# Patient Record
Sex: MALE | Race: WHITE | NOT HISPANIC OR LATINO | Employment: FULL TIME | URBAN - METROPOLITAN AREA
[De-identification: names, ages, dates, MRNs, and addresses within clinical notes are randomized per-mention and may not be internally consistent; named-entity substitution may affect disease eponyms.]

---

## 2017-02-07 ENCOUNTER — HISTORICAL (OUTPATIENT)
Dept: RADIOLOGY | Facility: HOSPITAL | Age: 47
End: 2017-02-07

## 2017-04-06 ENCOUNTER — HISTORICAL (OUTPATIENT)
Dept: LAB | Facility: HOSPITAL | Age: 47
End: 2017-04-06

## 2017-05-08 LAB — FINAL CULTURE: NORMAL

## 2017-05-30 ENCOUNTER — HISTORICAL (OUTPATIENT)
Dept: SURGERY | Facility: HOSPITAL | Age: 47
End: 2017-05-30

## 2017-05-30 LAB
ABS NEUT (OLG): 4.5 X10(3)/MCL (ref 1.5–6.9)
ALBUMIN SERPL-MCNC: 4 GM/DL (ref 3.4–5)
ALBUMIN/GLOB SERPL: 1.1 RATIO
ALP SERPL-CCNC: 94 UNIT/L (ref 30–113)
ALT SERPL-CCNC: 60 UNIT/L (ref 10–45)
APTT PPP: 30.7 SECOND(S) (ref 25–35)
AST SERPL-CCNC: 31 UNIT/L (ref 15–37)
BILIRUB SERPL-MCNC: 0.3 MG/DL (ref 0.1–0.9)
BILIRUBIN DIRECT+TOT PNL SERPL-MCNC: 0.1 MG/DL (ref 0–0.3)
BILIRUBIN DIRECT+TOT PNL SERPL-MCNC: 0.2 MG/DL
BUN SERPL-MCNC: 12 MG/DL (ref 10–20)
CALCIUM SERPL-MCNC: 8.8 MG/DL (ref 8–10.5)
CHLORIDE SERPL-SCNC: 106 MMOL/L (ref 100–108)
CO2 SERPL-SCNC: 28 MMOL/L (ref 21–35)
CREAT SERPL-MCNC: 0.94 MG/DL (ref 0.7–1.3)
ERYTHROCYTE [DISTWIDTH] IN BLOOD BY AUTOMATED COUNT: 14.2 % (ref 11.5–17)
GLOBULIN SER-MCNC: 3.6 GM/DL
GLUCOSE SERPL-MCNC: 114 MG/DL (ref 75–116)
HCT VFR BLD AUTO: 42.3 % (ref 42–52)
HGB BLD-MCNC: 14.4 GM/DL (ref 14–18)
INR PPP: 1 (ref 0–1.2)
MCH RBC QN AUTO: 31 PG (ref 27–34)
MCHC RBC AUTO-ENTMCNC: 34 GM/DL (ref 31–36)
MCV RBC AUTO: 90 FL (ref 80–99)
PLATELET # BLD AUTO: 222 X10(3)/MCL (ref 140–400)
PMV BLD AUTO: 9.8 FL (ref 6.8–10)
POTASSIUM SERPL-SCNC: 4 MMOL/L (ref 3.6–5.2)
PROT SERPL-MCNC: 7.6 GM/DL (ref 6.4–8.2)
PROTHROMBIN TIME: 10.5 SECOND(S) (ref 9–12)
RBC # BLD AUTO: 4.71 X10(6)/MCL (ref 4.7–6.1)
SODIUM SERPL-SCNC: 138 MMOL/L (ref 135–145)
WBC # SPEC AUTO: 6.7 X10(3)/MCL (ref 4.5–11.5)

## 2017-06-01 ENCOUNTER — HISTORICAL (OUTPATIENT)
Dept: ANESTHESIOLOGY | Facility: HOSPITAL | Age: 47
End: 2017-06-01

## 2020-01-22 ENCOUNTER — HISTORICAL (OUTPATIENT)
Dept: RADIOLOGY | Facility: HOSPITAL | Age: 50
End: 2020-01-22

## 2021-08-13 ENCOUNTER — HISTORICAL (OUTPATIENT)
Dept: RADIOLOGY | Facility: HOSPITAL | Age: 51
End: 2021-08-13

## 2022-02-18 ENCOUNTER — HISTORICAL (OUTPATIENT)
Dept: ADMINISTRATIVE | Facility: HOSPITAL | Age: 52
End: 2022-02-18

## 2022-03-03 ENCOUNTER — HISTORICAL (OUTPATIENT)
Dept: RADIOLOGY | Facility: HOSPITAL | Age: 52
End: 2022-03-03

## 2022-03-03 ENCOUNTER — HISTORICAL (OUTPATIENT)
Dept: ADMINISTRATIVE | Facility: HOSPITAL | Age: 52
End: 2022-03-03

## 2022-04-30 NOTE — OP NOTE
PROCEDURE:  Esophagogastroduodenoscopy.    PREOPERATIVE DIAGNOSIS:  Epigastric pain and also dyspepsia.    POSTOPERATIVE DIAGNOSES:    1. Mild to moderate gastritis, status post H pylori of gastric antrum.    2. Focal inflammation (polypoid) of the angularis, cardiac and fundic regions, status post random biopsy.    HISTORY OF PRESENT ILLNESS:  A very pleasant, 47-year-old gentleman who had a diagnosed bout of diverticulitis per CT scans and examination approximately 3 months ago.  At that point in time, he was treated for that in the hospital.  He recovered well.  Today he was supposed to undergo appropriate workup GI workup for his diverticula after his diverticular bout.  CT scan showed that it was diverticulitis in the transverse colon.     Upon evaluating the patient this morning, the patient states that he had, over the last 2 weeks since I had seen in my office, developed some epigastric pain that he said was new.  The pain was causing him issues after he ate and he was not having any changes in his bowel habits as he was with his diverticular issues of recent past.  I therefore elected to perform EGD in the setting of new symptoms to discern if this is something new versus a recurrence of something that was perhaps not apparent at the time of his hospital stay last time     He was consented for the procedure here at the hospital with the nurse, Debra present.  The risks and benefits of the procedure were explained to him before.  He had no preoperative anesthesia or sedation.  Please see their records for perioperative documentation and administration of anesthesia.    PROCEDURE IN DETAIL:  He was brought down to the endoscopy room suite, laid in the left lateral decubitus position.  Bite block was placed.  Anesthesia was administered.  The Olympus gastroscope was then lubricated and inserted in the posterior oropharynx.  Photographs were taken.  There were no abnormalities in the posterior pharynx  or laryngeal region.     The scope was then lubricated, passed down the esophagus without any signs of blockage.     The scope was then used to insufflate and washout with Mylicon, the mucosa.       The pylorus was intubated.  The duodenum and jejunum showed no abnormalities.  The mucosa appeared healthy, normal without any masses, polyps or any ulcerations.     The scope was pulled back in the greater and lesser curvature.  There was some mild gastritis that was present.  A biopsy was taken the gastric antrum for H pylori.  This was sent off for path.     The scope was pulled back and in the angularis and fundic and cardiac regions, there were focal areas, approximately 7-10 mm areas of focal, erythematous inflammation that had some submucosal elevation (polypoid) in nature.  I biopsied one of the more prominent ones in the angularis and sent this off for path in jar #2.  I did not biopsy the other areas because they were hyperemic and tended to ooze very easily, but they all had similar pathology.  There were no other masses, true polyps or ulceration.  He did not have a hiatal hernia.  The scope was pulled back out of retroflexion and the GE junction was within normal limits.  No masses.  No polyps.  Good mucosal integrity and esophageal integrity.  The scope was pulled back in the proximal.  The entire esophageal column had good peristalsis and no signs of any mucosal changes.  The scope was pulled back and removed.  The patient tolerated the procedure well.     In summary, a 47-year-old white male with mild to moderate gastritis with focal areas of erythematous inflammation status post random biopsy.     With regard to the patient's abdominal pain, the first question in my mind is was disconnected at all to his hospital stay prior to.  CT showed some inflammation but again, could this play a part or is it something new altogether.  Symptomatically, I think the 2 are separate.     We will follow up on the  biopsies.  Consider putting him on empiric PPI and we will see him back in my office to discuss those details.     See colonoscopy results as well below.  Of note, the patient is on anti rheumatologic drugs, so take that into consideration.       Differential here includes infection, bacterial or even viral considering his immunosuppression due to immunosuppressive drugs, versus diet induced.      POLLY/MARCELA   DD: 06/01/2017 0807   DT: 06/01/2017 0825  Job # [id number]    cc: Crow Daigle III, M.D.

## 2022-04-30 NOTE — H&P
ADDENDUM:  The patient was being evaluated today for first time colonoscopy due to diverticulitis approximately 2-3 months ago.  At that point in time he was having change in his bowel habits as well.     Upon discussion with the patient, he had new symptoms over the last 2 weeks duration.  He had postprandial type pain and dyspepsia.  Therefore, I elected to perform EGD in addition to the colonoscopy because of the similar anatomic locale of symptoms, in addition to the fact that this was new symptomatology compared to what he was presenting with 2-3 months ago.     Any questions regarding this, please feel free to call my office at [phone].     I think in the patient's best interest while he is here we should perform EGD to evaluate his new symptoms.        RADHA   DD: 06/01/2017 0813   DT: 06/01/2017 0832  Job # [id number]    cc: Crow Daigle III, M.D.

## 2022-04-30 NOTE — OP NOTE
PROCEDURE:  Colonoscopy.    PREOPERATIVE DIAGNOSIS:  Diverticulitis (history of).    POSTOPERATIVE DIAGNOSES:    1. Left-sided predominant diverticulosis without perforation, without bleed.    2. Nonspecific inflammatory changes to the sigmoid status post random biopsies.    HISTORY:  Please see my dictation for history on the EGD.  We are performing colonoscopy to ensure the patient has no malignant potential associated with his diverticular flare from 3 or 4 months ago.     The patient was consented for the procedure in my office prior to.  The risks and benefits of the procedure were explained to him in detail.  He was willing to undergo those risks.     The patient brought down to the endoscopy room suite and after he had his EGD, he was maintained in the left lateral decubitus position.  Rectal exam was performed.  Externally he had some atrophic changes to the perianal region due to his psoriasis.  Good rectal tone.  No internal abnormalities palpated.  Prostate was smooth, trilobar, without any changes.     The Olympus colonoscope was then lubricated and advanced all the way to the cecum.  The cecum was visualized and photographed.  The appendiceal orifice was within normal limits.  The terminal ilium was intubated and photographed.  It showed no internal abnormalities.     The cecum in its entirety showed no internal abnormalities as well.     The scope was pulled back.  The right side was evaluated.  No signs of diverticular disease.  No masses.  No polyps.  Same applies to the hepatic flexure.     There was a paucity of diverticula, maybe 1 or 2 that were approximately 2-3 mm, 4 at the most, in the transverse colon.  This is where his diverticular flare was per CT.  Somewhat ironic considering the CT findings and the degree of issues he was having.  Nonetheless, he did have some mild diverticular disease without bleeding, without perforation.  No masses, no polyps in this region.  Same applies to  the splenic flexure.     There was an increasing gradient in the distal descending of diverticular disease, primarily in the sigmoid.  However, no masses, polyps otherwise.     The scope was pulled back and in the sigmoid colon there was the most pronounced amount of diverticular disease.  There was an area of mild focal inflammation and these were biopsied randomly in the sigmoid.  These were sent in a separate jar.     The scope was pulled back in the rectum and retroflexion showed no internal abnormalities.     In summary, a 47-year-old white male with left-sided predominant diverticulosis without perforation, without bleed, with nonspecific inflammation of the sigmoid status post biopsy.     We will follow up on this gentleman's biopsies.  Again, I think diverticular issues were true in the sense that they caused him issues; however, could he have some other sort of colonic process going on, a colitis of some sort?  was this an infectious colitis due to his immunosuppressants?  again, we will try to monitor this over time and see how he does.  For right now, in terms of recommendations of colonoscopy, we will repeat in 5-10 years depending on symptomatology.        POLLY/MARCELA   DD: 06/01/2017 0811   DT: 06/01/2017 0829  Job # [id number]

## 2023-07-17 ENCOUNTER — CLINICAL SUPPORT (OUTPATIENT)
Dept: RESPIRATORY THERAPY | Facility: HOSPITAL | Age: 53
End: 2023-07-17
Attending: INTERNAL MEDICINE
Payer: COMMERCIAL

## 2023-07-17 DIAGNOSIS — Z80.0 FAMILY HISTORY OF COLON CANCER: Primary | ICD-10-CM

## 2023-07-17 DIAGNOSIS — Z80.0 FAMILY HISTORY OF COLON CANCER: ICD-10-CM

## 2023-07-17 PROCEDURE — 93010 EKG 12-LEAD: ICD-10-PCS | Mod: ,,, | Performed by: INTERNAL MEDICINE

## 2023-07-17 PROCEDURE — 93005 ELECTROCARDIOGRAM TRACING: CPT

## 2023-07-17 PROCEDURE — 93010 ELECTROCARDIOGRAM REPORT: CPT | Mod: ,,, | Performed by: INTERNAL MEDICINE

## 2023-07-17 RX ORDER — OMEPRAZOLE 40 MG/1
1 CAPSULE, DELAYED RELEASE ORAL EVERY MORNING
COMMUNITY
Start: 2023-07-17

## 2023-07-17 RX ORDER — SERTRALINE HYDROCHLORIDE 50 MG/1
50 TABLET, FILM COATED ORAL EVERY MORNING
COMMUNITY
Start: 2023-07-10

## 2023-07-17 RX ORDER — LEVOCETIRIZINE DIHYDROCHLORIDE 5 MG/1
5 TABLET, FILM COATED ORAL NIGHTLY
COMMUNITY

## 2023-07-17 RX ORDER — CLONAZEPAM 0.5 MG/1
TABLET ORAL
COMMUNITY
Start: 2023-06-29

## 2023-07-17 RX ORDER — FOLIC ACID 1 MG/1
1000 TABLET ORAL EVERY MORNING
COMMUNITY
Start: 2023-05-15

## 2023-07-17 RX ORDER — MULTIVITAMIN
1 TABLET ORAL EVERY MORNING
COMMUNITY

## 2023-07-17 RX ORDER — AZELASTINE 1 MG/ML
2 SPRAY, METERED NASAL 2 TIMES DAILY
COMMUNITY
Start: 2023-06-26

## 2023-07-17 RX ORDER — METHOTREXATE 25 MG/ML
INJECTION INTRA-ARTERIAL; INTRAMUSCULAR; INTRATHECAL; INTRAVENOUS
COMMUNITY
Start: 2023-07-10

## 2023-07-17 NOTE — DISCHARGE INSTRUCTIONS
Follow prep on Wednesday. Clear liquids only. Nothing by mouth after midnight.                                                                                            INSTRUCTIONS  AFTER A COLONOSCOPY/EGD                                                                                    NO DRIVING X 24 HOURS. NOTIFY YOUR DOCTOR WITH                                                                                 ABDOMINAL PAIN UNRELIEVED BY  PASSING GAS,                                                                           FEVER WITHIN 24 HOURS, OR LARGE AMOUNT OF BLEEDING.

## 2023-07-19 ENCOUNTER — ANESTHESIA EVENT (OUTPATIENT)
Dept: SURGERY | Facility: HOSPITAL | Age: 53
End: 2023-07-19
Payer: COMMERCIAL

## 2023-07-19 NOTE — ANESTHESIA PREPROCEDURE EVALUATION
07/19/2023  Leonid Blount is a 53 y.o., male.      Pre-op Assessment    I have reviewed the Patient Summary Reports.     I have reviewed the Nursing Notes. I have reviewed the NPO Status.   I have reviewed the Medications.     Review of Systems  Anesthesia Hx:  Denies Family Hx of Anesthesia complications.   Denies Personal Hx of Anesthesia complications.   Social:  Former Smoker, No Alcohol Use    Hematology/Oncology:  Hematology Normal   Oncology Normal     EENT/Dental:EENT/Dental Normal   Cardiovascular:  Cardiovascular Normal  ECG has been reviewed.    Pulmonary:  Pulmonary Normal    Renal/:  Renal/ Normal     Hepatic/GI:  Hepatic/GI Normal    Musculoskeletal:  Musculoskeletal Normal    Neurological:  Neurology Normal    Endocrine:  Endocrine Normal    Dermatological:  Skin Normal    Psych:  Psychiatric Normal           Physical Exam  General: Cooperative, Alert and Oriented    Airway:  Mallampati: II   Mouth Opening: Normal  TM Distance: Normal  Tongue: Normal  Neck ROM: Normal ROM    Dental:  Intact        Anesthesia Plan  Type of Anesthesia, risks & benefits discussed:    Anesthesia Type: Gen Natural Airway  Intra-op Monitoring Plan: Standard ASA Monitors  Post Op Pain Control Plan:   (medical reason for not using multimodal pain management)  Induction:  IV  Informed Consent: Informed consent signed with the Patient and all parties understand the risks and agree with anesthesia plan.  All questions answered. Patient consented to blood products? Yes  ASA Score: 1    Ready For Surgery From Anesthesia Perspective.     .

## 2023-07-20 ENCOUNTER — HOSPITAL ENCOUNTER (OUTPATIENT)
Facility: HOSPITAL | Age: 53
Discharge: HOME | End: 2023-07-20
Attending: INTERNAL MEDICINE | Admitting: INTERNAL MEDICINE
Payer: COMMERCIAL

## 2023-07-20 ENCOUNTER — ANESTHESIA (OUTPATIENT)
Dept: SURGERY | Facility: HOSPITAL | Age: 53
End: 2023-07-20
Payer: COMMERCIAL

## 2023-07-20 VITALS
TEMPERATURE: 98 F | OXYGEN SATURATION: 97 % | SYSTOLIC BLOOD PRESSURE: 132 MMHG | DIASTOLIC BLOOD PRESSURE: 84 MMHG | WEIGHT: 187 LBS | HEIGHT: 67 IN | RESPIRATION RATE: 20 BRPM | HEART RATE: 58 BPM | BODY MASS INDEX: 29.35 KG/M2

## 2023-07-20 DIAGNOSIS — Z80.0 FAMILY HX OF COLON CANCER: ICD-10-CM

## 2023-07-20 DIAGNOSIS — D63.8 ANEMIA OF CHRONIC ILLNESS: ICD-10-CM

## 2023-07-20 DIAGNOSIS — Z80.0 FAMILY HISTORY OF COLON CANCER: ICD-10-CM

## 2023-07-20 PROCEDURE — D9220A PRA ANESTHESIA: Mod: ,,, | Performed by: NURSE ANESTHETIST, CERTIFIED REGISTERED

## 2023-07-20 PROCEDURE — 45385 COLONOSCOPY W/LESION REMOVAL: CPT | Mod: PT | Performed by: INTERNAL MEDICINE

## 2023-07-20 PROCEDURE — 63600175 PHARM REV CODE 636 W HCPCS: Performed by: ANESTHESIOLOGY

## 2023-07-20 PROCEDURE — 37000008 HC ANESTHESIA 1ST 15 MINUTES: Performed by: INTERNAL MEDICINE

## 2023-07-20 PROCEDURE — 37000009 HC ANESTHESIA EA ADD 15 MINS: Performed by: INTERNAL MEDICINE

## 2023-07-20 PROCEDURE — D9220A PRA ANESTHESIA: ICD-10-PCS | Mod: ,,, | Performed by: NURSE ANESTHETIST, CERTIFIED REGISTERED

## 2023-07-20 PROCEDURE — 43239 EGD BIOPSY SINGLE/MULTIPLE: CPT | Performed by: INTERNAL MEDICINE

## 2023-07-20 PROCEDURE — 45380 COLONOSCOPY AND BIOPSY: CPT | Mod: PT | Performed by: INTERNAL MEDICINE

## 2023-07-20 PROCEDURE — 25000003 PHARM REV CODE 250: Performed by: NURSE ANESTHETIST, CERTIFIED REGISTERED

## 2023-07-20 PROCEDURE — 27201423 OPTIME MED/SURG SUP & DEVICES STERILE SUPPLY: Performed by: INTERNAL MEDICINE

## 2023-07-20 PROCEDURE — 63600175 PHARM REV CODE 636 W HCPCS: Performed by: NURSE ANESTHETIST, CERTIFIED REGISTERED

## 2023-07-20 RX ORDER — PROPOFOL 10 MG/ML
VIAL (ML) INTRAVENOUS
Status: DISCONTINUED | OUTPATIENT
Start: 2023-07-20 | End: 2023-07-20

## 2023-07-20 RX ORDER — LIDOCAINE HYDROCHLORIDE 20 MG/ML
INJECTION INTRAVENOUS
Status: DISCONTINUED | OUTPATIENT
Start: 2023-07-20 | End: 2023-07-20

## 2023-07-20 RX ORDER — SODIUM CHLORIDE, SODIUM LACTATE, POTASSIUM CHLORIDE, CALCIUM CHLORIDE 600; 310; 30; 20 MG/100ML; MG/100ML; MG/100ML; MG/100ML
INJECTION, SOLUTION INTRAVENOUS CONTINUOUS
Status: DISCONTINUED | OUTPATIENT
Start: 2023-07-20 | End: 2023-07-20 | Stop reason: HOSPADM

## 2023-07-20 RX ADMIN — PROPOFOL 50 MG: 10 INJECTION, EMULSION INTRAVENOUS at 07:07

## 2023-07-20 RX ADMIN — PROPOFOL 50 MG: 10 INJECTION, EMULSION INTRAVENOUS at 06:07

## 2023-07-20 RX ADMIN — SODIUM CHLORIDE, POTASSIUM CHLORIDE, SODIUM LACTATE AND CALCIUM CHLORIDE: 600; 310; 30; 20 INJECTION, SOLUTION INTRAVENOUS at 05:07

## 2023-07-20 RX ADMIN — LIDOCAINE HYDROCHLORIDE 100 MG: 20 INJECTION, SOLUTION INTRAVENOUS at 06:07

## 2023-07-20 RX ADMIN — PROPOFOL 100 MG: 10 INJECTION, EMULSION INTRAVENOUS at 06:07

## 2023-07-20 NOTE — OP NOTE
[facility] - Periop Services  Operative Note    Date of Procedure: 7/20/2023     Procedure: Procedure(s) (LRB):  COLONOSCOPY (N/A)  EGD (ESOPHAGOGASTRODUODENOSCOPY) (N/A)  EGD, WITH CLOSED BIOPSY (N/A)  COLONOSCOPY, WITH 1 OR MORE BIOPSIES (N/A)  COLONOSCOPY, WITH DIRECTED SUBMUCOSAL INJECTION (N/A)  COLONOSCOPY, WITH POLYPECTOMY USING SNARE (N/A)  COLONOSCOPY, WITH POLYPECTOMY USING HOT BIOPSY FORCEPS (N/A)   Colonoscopy with cold biopsy polypectomy  Submucosal elevation sterile saline with hot snare removal    Surgeon(s) and Role:     * Crow Daigle III, MD - Primary    Assisting Surgeon: None    Pre-Operative Diagnosis: Family history of colon cancer [Z80.0]  Anemia of chronic illness [D63.8]  Family history of colon cancer     Post-Operative Diagnosis: Post-Op Diagnosis Codes:     * Family history of colon cancer [Z80.0]     * Anemia of chronic illness [D63.8]  Status post biopsy terminal ileal valve rule out polyp  Scant pandiverticulosis   Transfers subcentimeter planar polypectomy with saline elevation and hot snare   Biopsy sigmoid colon rule out irritable bowel disease  Rectal polyp status post cold biopsy removal.  .    Endoscopic Specimens:  ID Type Source Tests Collected by Time Destination   A : gastric antrum biopsy; biopsy Tissue Antrum SPECIMEN TO PATHOLOGY Crow Daigle III, MD 7/20/2023 0637    B : cardia of stomach biopsy; biopsy Tissue Stomach SPECIMEN TO PATHOLOGY Crow Daigle III, MD 7/20/2023 0638    C : fundus of stomach biopsy; biopsy Tissue Stomach SPECIMEN TO PATHOLOGY Crow Daigle III, MD 7/20/2023 0638    D : Ileocecal valve biopsy; biopsy Tissue Colon SPECIMEN TO PATHOLOGY Crow Daigle III, MD 7/20/2023 0708    E : mid transverse polyp; polypectomy Tissue Intestine Large, Transverse Colon SPECIMEN TO PATHOLOGY Crow Daigle III, MD 7/20/2023 4087    F : random biopsy of sigmoid colon; biopsy Tissue Intestine Large, Sigmoid SPECIMEN TO PATHOLOGY  Crow Daigle III, MD 7/20/2023 0639    G : rectal polyp; polypectomy Tissue Rectum SPECIMEN TO PATHOLOGY Crow Daigle III, MD 7/20/2023 6062          Anesthesia: General    Consent:  Patient was consented for the procedure at my office.  The risks and benefits of procedure explained in detail.  They were willing to undergo those risks.    HPI & Operative Findings (including complications, if any): 53-year-old white male with a paternal history of colon cancer.  His father required this in his early 90s.  He is here for colon cancer surveillance.      Patient was laid in left lateral decubitus position after his EGD was turned 180° rectal exam was performed was within normal limits prostate normal.      The Olympus colonoscope was then lubricated advanced all the way to the cecum.  The terminal ileum was then intubated to 10 cm was pristine no abnormalities noted.  360 degree circumferential views were taken of the entirety of the colon.  Just at the terminal ileal valve however, there was 1 area that looked somewhat polypoid.  This could have been normal tissue but we biopsied it removed piecemeal to be sure.  Sent off in jar letter D.      360 degree circumferential views were taken of the cecum, transverse colon, hepatic flexure which were normal.  In the mid transverse there was a large diverticulum of note, this is where he had diverticulitis 5 years ago versus colitis.    Nonetheless in this segment there was a proximally 1 cm planar polyp that was tattooed on the way in and then removed with the about 4 cc of sterile saline..  It was removed with a small snare electrocautery settings 12/6 and then was captured in the polyp trap and sent off for jar letter E for pathology.  No perforation no bleed electrocautery settings of 12/6.      The rest of the transverse colon splenic flexure descending colon were within normal limits.  Had a hyper concentration of diverticular disease in sigmoid with some  erythema distal to this in the sigmoid that was biopsied and sent off in jar letter F.  The rest of the sigmoid without any polyps or masses.  However in the rectum there was a small 2 mm polyp that was removed after retroflexion and placed in jar letter G.  Scope was removed patient tolerated procedure well.      Discussion:    Follow up on these biopsies closely.  And likely repeat colonoscopy in 3-5 years pending pathology.  Blood Loss (EBL): 0 mL           Implants: * No implants in log *    Specimens:   Specimen (24h ago, onward)       Start     Ordered    07/20/23 0643  Specimen to Pathology  RELEASE UPON ORDERING        Comments: Specimen A: For h. pylori                             References:    Click here for ordering Quick Tip   Question:  Release to patient  Answer:  Immediate    07/20/23 0643                            Condition: Stable for Discharge    Disposition: Home or Self Care        Discharge Note    OUTCOME: Patient tolerated treatment/procedure well without complication and is now ready for discharge.    DISPOSITION: Home or Self Care    FINAL DIAGNOSIS:  <principal problem not specified>    FOLLOWUP: Follow up in clinic in 1-2 weeks to review biopsies    DISCHARGE INSTRUCTIONS:  No discharge procedures on file.     Clinical Reference Documents Added to Patient Instructions         Document    COLONOSCOPY DISCHARGE INSTRUCTIONS (ENGLISH)    UPPER GI ENDOSCOPY DISCHARGE INSTRUCTIONS (ENGLISH)

## 2023-07-20 NOTE — ANESTHESIA POSTPROCEDURE EVALUATION
Anesthesia Post Evaluation    Patient: Leonid Blount    Procedure(s) Performed: Procedure(s) (LRB):  COLONOSCOPY (N/A)  EGD (ESOPHAGOGASTRODUODENOSCOPY) (N/A)  EGD, WITH CLOSED BIOPSY (N/A)  COLONOSCOPY, WITH 1 OR MORE BIOPSIES (N/A)  COLONOSCOPY, WITH DIRECTED SUBMUCOSAL INJECTION (N/A)  COLONOSCOPY, WITH POLYPECTOMY USING SNARE (N/A)  COLONOSCOPY, WITH POLYPECTOMY USING HOT BIOPSY FORCEPS (N/A)    Final Anesthesia Type: general      Patient location during evaluation: OPS  Patient participation: Yes- Able to Participate  Level of consciousness: awake and alert  Post-procedure vital signs: reviewed and stable  Pain management: adequate  Airway patency: patent  JULIA mitigation strategies: Multimodal analgesia  PONV status at discharge: No PONV  Anesthetic complications: no      Cardiovascular status: hemodynamically stable  Respiratory status: unassisted, spontaneous ventilation and room air  Hydration status: euvolemic  Follow-up not needed.          Vitals Value Taken Time   /81 07/20/23 0544   Temp 36.6 °C (97.8 °F) 07/20/23 0543   Pulse 65 07/20/23 0544   Resp 20 07/20/23 0543   SpO2 93 % 07/20/23 0544         No case tracking events are documented in the log.      Pain/Kyle Score: No data recorded

## 2023-07-20 NOTE — OP NOTE
[facility] - Periop Services  Operative Note    Date of Procedure: 7/20/2023     Procedure: Procedure(s) (LRB):  COLONOSCOPY (N/A)  EGD (ESOPHAGOGASTRODUODENOSCOPY) (N/A)  EGD, WITH CLOSED BIOPSY (N/A)  COLONOSCOPY, WITH 1 OR MORE BIOPSIES (N/A)  COLONOSCOPY, WITH DIRECTED SUBMUCOSAL INJECTION (N/A)  COLONOSCOPY, WITH POLYPECTOMY USING SNARE (N/A)  COLONOSCOPY, WITH POLYPECTOMY USING HOT BIOPSY FORCEPS (N/A)   EGD with biopsy    Surgeon(s) and Role:     * Crow Daigle III, MD - Primary    Assisting Surgeon: None    Pre-Operative Diagnosis: Family history of colon cancer [Z80.0]  Anemia of chronic illness [D63.8]  Anemia    Post-Operative Diagnosis: Post-Op Diagnosis Codes:     * Family history of colon cancer [Z80.0]     * Anemia of chronic illness [D63.8]  Mild chronic gastritis  Diffuse inclusions to the gastric cavity status post biopsy    Endoscopic Specimens:  ID Type Source Tests Collected by Time Destination   A : gastric antrum biopsy; biopsy Tissue Antrum SPECIMEN TO PATHOLOGY Crow Daigle III, MD 7/20/2023 0637    B : cardia of stomach biopsy; biopsy Tissue Stomach SPECIMEN TO PATHOLOGY Crow Daigle III, MD 7/20/2023 0638    C : fundus of stomach biopsy; biopsy Tissue Stomach SPECIMEN TO PATHOLOGY Crow Daigle III, MD 7/20/2023 0638    D : Ileocecal valve biopsy; biopsy Tissue Colon SPECIMEN TO PATHOLOGY Crow Daigle III, MD 7/20/2023 0708    E : mid transverse polyp; polypectomy Tissue Intestine Large, Transverse Colon SPECIMEN TO PATHOLOGY Crow Daigle III, MD 7/20/2023 0713    F : random biopsy of sigmoid colon; biopsy Tissue Intestine Large, Sigmoid SPECIMEN TO PATHOLOGY Crow Daigle III, MD 7/20/2023 0719    G : rectal polyp; polypectomy Tissue Rectum SPECIMEN TO PATHOLOGY Crow Daigle III, MD 7/20/2023 0775          Anesthesia: General    Consent:  Patient was consented for the procedure at my office.  The risks and benefits of procedure explained  in detail.  They were willing to undergo those risks.    HPI & Operative Findings (including complications, if any):  53-year-old white male with a history of psoriatic arthritis on methotrexate here for workup of anemia.  Stools were 2/2- for blood.    Patient was brought down to the endoscopy room suite.  Laid in a semi left lateral decubitus position bite block was placed anesthesia was administered.  The Olympus gastroscope was lubricated inserted the posterior oropharynx which was normal.  The esophageal column was then passed and it was normal.      The stomach showed generalized gastritis.  Throughout he would some very micro vesicular almost vesicular looking hyperemic lesions that had a central cap that was pale yellow almost like a microvascular ulcer but without any obvious oozing.  Upon biopsies they did not bleed.  See later in dictation.  Nonetheless there was chronic moderate gastritis.  The pyloric sphincter was then intubated the duodenum and was within normal limits.      The gastric antrum was then biopsied for H pylori in jar A.  Jar B was then biopsied of these diffuse micro vesicular appearing lesions in the cardia region and then jar C was the fundus.  No signs of hiatal hernia.  No masses no polyps.      The scope turned out of retroflexion GE junction was examined it was pristine.  Esophageal column was normal.  Good peristalsis of the esophageal column and stomach.  Scope was removed patient tolerated procedure well.      Discussion:    Will consider placing him on a PPI.  Follow up on the biopsies 1st rule out infection and then proceed from there.  Could this also be a manifestation of immune compromise versus psoriatic arthritis.  Will continue follow up.            Blood Loss (EBL): 0 mL           Implants: * No implants in log *    Specimens:   Specimen (24h ago, onward)       Start     Ordered    07/20/23 0643  Specimen to Pathology  RELEASE UPON ORDERING        Comments: Specimen A:  For h. pylori                             References:    Click here for ordering Quick Tip   Question:  Release to patient  Answer:  Immediate    07/20/23 0643                            Condition: Stable for Discharge    Disposition: Home or Self Care        Discharge Note    OUTCOME: Patient tolerated treatment/procedure well without complication and is now ready for discharge.    DISPOSITION: Home or Self Care    FINAL DIAGNOSIS:  <principal problem not specified>    FOLLOWUP: Follow up in clinic in 1-2 weeks to review biopsies    DISCHARGE INSTRUCTIONS:  No discharge procedures on file.     Clinical Reference Documents Added to Patient Instructions         Document    COLONOSCOPY DISCHARGE INSTRUCTIONS (ENGLISH)    UPPER GI ENDOSCOPY DISCHARGE INSTRUCTIONS (ENGLISH)

## 2023-07-21 LAB — PSYCHE PATHOLOGY RESULT: NORMAL

## 2025-08-19 ENCOUNTER — LAB VISIT (OUTPATIENT)
Dept: LAB | Facility: HOSPITAL | Age: 55
End: 2025-08-19
Attending: INTERNAL MEDICINE
Payer: COMMERCIAL

## 2025-08-19 DIAGNOSIS — E61.1 IRON DEFICIENCY: Primary | ICD-10-CM

## 2025-08-19 DIAGNOSIS — D63.8 ANEMIA OF CHRONIC DISORDER: ICD-10-CM

## 2025-08-19 LAB
ALBUMIN SERPL-MCNC: 3.8 G/DL (ref 3.5–5)
ALBUMIN/GLOB SERPL: 1.2 RATIO (ref 1.1–2)
ALP SERPL-CCNC: 81 UNIT/L (ref 40–150)
ALT SERPL-CCNC: 26 UNIT/L (ref 0–55)
ANION GAP SERPL CALC-SCNC: 10 MEQ/L
AST SERPL-CCNC: 19 UNIT/L (ref 11–45)
BASOPHILS # BLD AUTO: 0.06 X10(3)/MCL
BASOPHILS NFR BLD AUTO: 1 %
BILIRUB SERPL-MCNC: 0.4 MG/DL
BUN SERPL-MCNC: 10 MG/DL (ref 8.4–25.7)
CALCIUM SERPL-MCNC: 9 MG/DL (ref 8.4–10.2)
CHLORIDE SERPL-SCNC: 109 MMOL/L (ref 98–107)
CO2 SERPL-SCNC: 23 MMOL/L (ref 22–29)
CREAT SERPL-MCNC: 0.8 MG/DL (ref 0.72–1.25)
CREAT/UREA NIT SERPL: 13
EOSINOPHIL # BLD AUTO: 0.13 X10(3)/MCL (ref 0–0.9)
EOSINOPHIL NFR BLD AUTO: 2.1 %
ERYTHROCYTE [DISTWIDTH] IN BLOOD BY AUTOMATED COUNT: 14.4 % (ref 11.5–17)
FERRITIN SERPL-MCNC: 38.88 NG/ML (ref 21.81–274.66)
FOLATE SERPL-MCNC: 14.7 NG/ML (ref 7–31.4)
GFR SERPLBLD CREATININE-BSD FMLA CKD-EPI: >60 ML/MIN/1.73/M2
GLOBULIN SER-MCNC: 3.2 GM/DL (ref 2.4–3.5)
GLUCOSE SERPL-MCNC: 98 MG/DL (ref 74–100)
HCT VFR BLD AUTO: 39.5 % (ref 42–52)
HGB BLD-MCNC: 13 G/DL (ref 14–18)
IMM GRANULOCYTES # BLD AUTO: 0.02 X10(3)/MCL (ref 0–0.04)
IMM GRANULOCYTES NFR BLD AUTO: 0.3 %
IRON SERPL-MCNC: 77 UG/DL (ref 65–175)
LYMPHOCYTES # BLD AUTO: 1.22 X10(3)/MCL (ref 0.6–4.6)
LYMPHOCYTES NFR BLD AUTO: 19.6 %
MCH RBC QN AUTO: 29.3 PG (ref 27–31)
MCHC RBC AUTO-ENTMCNC: 32.9 G/DL (ref 33–36)
MCV RBC AUTO: 89 FL (ref 80–94)
MONOCYTES # BLD AUTO: 0.57 X10(3)/MCL (ref 0.1–1.3)
MONOCYTES NFR BLD AUTO: 9.2 %
NEUTROPHILS # BLD AUTO: 4.22 X10(3)/MCL (ref 2.1–9.2)
NEUTROPHILS NFR BLD AUTO: 67.8 %
NRBC BLD AUTO-RTO: 0 %
PLATELET # BLD AUTO: 237 X10(3)/MCL (ref 130–400)
PMV BLD AUTO: 9.6 FL (ref 7.4–10.4)
POTASSIUM SERPL-SCNC: 4.2 MMOL/L (ref 3.5–5.1)
PROT SERPL-MCNC: 7 GM/DL (ref 6.4–8.3)
RBC # BLD AUTO: 4.44 X10(6)/MCL (ref 4.7–6.1)
SODIUM SERPL-SCNC: 142 MMOL/L (ref 136–145)
TRANSFERRIN SERPL-MCNC: 269 MG/DL (ref 174–364)
WBC # BLD AUTO: 6.22 X10(3)/MCL (ref 4.5–11.5)

## 2025-08-19 PROCEDURE — 82728 ASSAY OF FERRITIN: CPT

## 2025-08-19 PROCEDURE — 80053 COMPREHEN METABOLIC PANEL: CPT

## 2025-08-19 PROCEDURE — 82746 ASSAY OF FOLIC ACID SERUM: CPT

## 2025-08-19 PROCEDURE — 36415 COLL VENOUS BLD VENIPUNCTURE: CPT

## 2025-08-19 PROCEDURE — 85025 COMPLETE CBC W/AUTO DIFF WBC: CPT

## 2025-08-19 PROCEDURE — 83540 ASSAY OF IRON: CPT

## 2025-08-19 PROCEDURE — 84466 ASSAY OF TRANSFERRIN: CPT

## (undated) DEVICE — MARKER ENDOSCOPIC SPOT EX

## (undated) DEVICE — TRAP SUCTION POLYP

## (undated) DEVICE — SNARE ENDO POLYP 2.3MM 240CM

## (undated) DEVICE — BITE BLOCK ADULT LATEX FREE

## (undated) DEVICE — KIT SURGICAL COLON .25 1.1OZ

## (undated) DEVICE — NDL INTERJECT CATH 25G 200CM

## (undated) DEVICE — FORCEP ENDO BIOPSY CAPTURA

## (undated) DEVICE — SYR SALINE FLSH PRFL STRL 10ML